# Patient Record
Sex: MALE | Race: WHITE | NOT HISPANIC OR LATINO | Employment: STUDENT | URBAN - METROPOLITAN AREA
[De-identification: names, ages, dates, MRNs, and addresses within clinical notes are randomized per-mention and may not be internally consistent; named-entity substitution may affect disease eponyms.]

---

## 2021-01-15 ENCOUNTER — OFFICE VISIT (OUTPATIENT)
Dept: URGENT CARE | Facility: CLINIC | Age: 31
End: 2021-01-15
Payer: COMMERCIAL

## 2021-01-15 ENCOUNTER — HOSPITAL ENCOUNTER (OUTPATIENT)
Facility: MEDICAL CENTER | Age: 31
End: 2021-01-15
Attending: PHYSICIAN ASSISTANT
Payer: COMMERCIAL

## 2021-01-15 ENCOUNTER — HOSPITAL ENCOUNTER (OUTPATIENT)
Dept: RADIOLOGY | Facility: MEDICAL CENTER | Age: 31
End: 2021-01-15
Attending: PHYSICIAN ASSISTANT
Payer: COMMERCIAL

## 2021-01-15 VITALS
RESPIRATION RATE: 16 BRPM | WEIGHT: 256.2 LBS | HEIGHT: 74 IN | SYSTOLIC BLOOD PRESSURE: 154 MMHG | HEART RATE: 76 BPM | BODY MASS INDEX: 32.88 KG/M2 | DIASTOLIC BLOOD PRESSURE: 88 MMHG | OXYGEN SATURATION: 97 % | TEMPERATURE: 96.7 F

## 2021-01-15 DIAGNOSIS — K29.00 ACUTE SUPERFICIAL GASTRITIS WITHOUT HEMORRHAGE: ICD-10-CM

## 2021-01-15 DIAGNOSIS — R10.11 RIGHT UPPER QUADRANT PAIN: ICD-10-CM

## 2021-01-15 DIAGNOSIS — R10.13 EPIGASTRIC ABDOMINAL PAIN: ICD-10-CM

## 2021-01-15 DIAGNOSIS — F10.10 EXCESSIVE DRINKING ALCOHOL: ICD-10-CM

## 2021-01-15 DIAGNOSIS — K21.9 GASTROESOPHAGEAL REFLUX DISEASE, UNSPECIFIED WHETHER ESOPHAGITIS PRESENT: ICD-10-CM

## 2021-01-15 LAB
ALBUMIN SERPL BCP-MCNC: 5.1 G/DL (ref 3.2–4.9)
ALBUMIN/GLOB SERPL: 1.7 G/DL
ALP SERPL-CCNC: 67 U/L (ref 30–99)
ALT SERPL-CCNC: 19 U/L (ref 2–50)
ANION GAP SERPL CALC-SCNC: 13 MMOL/L (ref 7–16)
AST SERPL-CCNC: 10 U/L (ref 12–45)
BASOPHILS # BLD AUTO: 0.6 % (ref 0–1.8)
BASOPHILS # BLD: 0.04 K/UL (ref 0–0.12)
BILIRUB SERPL-MCNC: 0.4 MG/DL (ref 0.1–1.5)
BUN SERPL-MCNC: 21 MG/DL (ref 8–22)
CALCIUM SERPL-MCNC: 9.9 MG/DL (ref 8.5–10.5)
CHLORIDE SERPL-SCNC: 102 MMOL/L (ref 96–112)
CO2 SERPL-SCNC: 24 MMOL/L (ref 20–33)
CREAT SERPL-MCNC: 1.14 MG/DL (ref 0.5–1.4)
EOSINOPHIL # BLD AUTO: 0.06 K/UL (ref 0–0.51)
EOSINOPHIL NFR BLD: 0.9 % (ref 0–6.9)
ERYTHROCYTE [DISTWIDTH] IN BLOOD BY AUTOMATED COUNT: 43.8 FL (ref 35.9–50)
GLOBULIN SER CALC-MCNC: 3 G/DL (ref 1.9–3.5)
GLUCOSE SERPL-MCNC: 90 MG/DL (ref 65–99)
HCT VFR BLD AUTO: 48.5 % (ref 42–52)
HGB BLD-MCNC: 15.9 G/DL (ref 14–18)
IMM GRANULOCYTES # BLD AUTO: 0.03 K/UL (ref 0–0.11)
IMM GRANULOCYTES NFR BLD AUTO: 0.4 % (ref 0–0.9)
LIPASE SERPL-CCNC: 35 U/L (ref 11–82)
LYMPHOCYTES # BLD AUTO: 1.36 K/UL (ref 1–4.8)
LYMPHOCYTES NFR BLD: 20.4 % (ref 22–41)
MCH RBC QN AUTO: 30.1 PG (ref 27–33)
MCHC RBC AUTO-ENTMCNC: 32.8 G/DL (ref 33.7–35.3)
MCV RBC AUTO: 91.9 FL (ref 81.4–97.8)
MONOCYTES # BLD AUTO: 0.38 K/UL (ref 0–0.85)
MONOCYTES NFR BLD AUTO: 5.7 % (ref 0–13.4)
NEUTROPHILS # BLD AUTO: 4.81 K/UL (ref 1.82–7.42)
NEUTROPHILS NFR BLD: 72 % (ref 44–72)
NRBC # BLD AUTO: 0 K/UL
NRBC BLD-RTO: 0 /100 WBC
PLATELET # BLD AUTO: 295 K/UL (ref 164–446)
PMV BLD AUTO: 10 FL (ref 9–12.9)
POTASSIUM SERPL-SCNC: 4.3 MMOL/L (ref 3.6–5.5)
PROT SERPL-MCNC: 8.1 G/DL (ref 6–8.2)
RBC # BLD AUTO: 5.28 M/UL (ref 4.7–6.1)
SODIUM SERPL-SCNC: 139 MMOL/L (ref 135–145)
WBC # BLD AUTO: 6.7 K/UL (ref 4.8–10.8)

## 2021-01-15 PROCEDURE — 76705 ECHO EXAM OF ABDOMEN: CPT

## 2021-01-15 PROCEDURE — 80053 COMPREHEN METABOLIC PANEL: CPT

## 2021-01-15 PROCEDURE — 99204 OFFICE O/P NEW MOD 45 MIN: CPT | Performed by: PHYSICIAN ASSISTANT

## 2021-01-15 PROCEDURE — 83690 ASSAY OF LIPASE: CPT

## 2021-01-15 PROCEDURE — 85025 COMPLETE CBC W/AUTO DIFF WBC: CPT

## 2021-01-15 RX ORDER — OMEPRAZOLE 20 MG/1
20 CAPSULE, DELAYED RELEASE ORAL DAILY
COMMUNITY
End: 2021-01-28

## 2021-01-15 RX ORDER — OMEPRAZOLE 40 MG/1
40 CAPSULE, DELAYED RELEASE ORAL DAILY
Qty: 30 CAP | Refills: 0 | Status: SHIPPED | OUTPATIENT
Start: 2021-01-15

## 2021-01-15 RX ORDER — SUCRALFATE 1 G/1
1 TABLET ORAL
Qty: 120 TAB | Refills: 3 | Status: SHIPPED | OUTPATIENT
Start: 2021-01-15

## 2021-01-15 SDOH — HEALTH STABILITY: MENTAL HEALTH: HOW MANY STANDARD DRINKS CONTAINING ALCOHOL DO YOU HAVE ON A TYPICAL DAY?: 3 OR 4

## 2021-01-15 SDOH — HEALTH STABILITY: MENTAL HEALTH: HOW OFTEN DO YOU HAVE 6 OR MORE DRINKS ON ONE OCCASION?: LESS THAN MONTHLY

## 2021-01-15 SDOH — HEALTH STABILITY: MENTAL HEALTH: HOW OFTEN DO YOU HAVE A DRINK CONTAINING ALCOHOL?: 4 OR MORE TIMES A WEEK

## 2021-01-15 ASSESSMENT — ENCOUNTER SYMPTOMS
HEARTBURN: 1
DIARRHEA: 0
ABDOMINAL PAIN: 1
CONSTIPATION: 1
NAUSEA: 1
FEVER: 0
VOMITING: 1
BLOOD IN STOOL: 0
CHILLS: 0

## 2021-01-15 NOTE — PROGRESS NOTES
Subjective:     Yossi Davis  is a 30 y.o. male who presents for Abdominal Pain (x 2-3 months, mid upper abdominal pain)      Mr. Davis is a 30-year-old male who presents today complaining of 2 to 3-month history of epigastric abdominal pain with associated nausea.  Patient reports that over the past 2 to 3 months he has just not been feeling well in general.  He has been wanting to get established with a primary care but has been unsuccessful.  He reports that the epigastric abdominal pain has progressively been worsening and approximately 1 week ago he had severe pain.  The severe pain was described as sharp and more located in the epigastric region and to the left.  Patient reports this lasted for 3 to 4 hours and eventually subsided with sleep.  Patient does admit to drinking a rather large amount of alcohol preceding the onset of symptoms.  Patient does admit to drinking alcohol on a fairly regular basis.  Patient denies hematemesis, hematochezia or melena.  Has no prior history of pancreatitis.  He does admit to previous history with GERD and did begin taking over-the-counter omeprazole thinking that this was possibly stomach irritation related to alcohol consumption.  Denies fever or chills.     Abdominal Pain  Associated symptoms include constipation, nausea and vomiting. Pertinent negatives include no diarrhea, fever or melena.         Review of Systems   Constitutional: Positive for malaise/fatigue. Negative for chills and fever.   Gastrointestinal: Positive for abdominal pain, constipation, heartburn, nausea and vomiting. Negative for blood in stool, diarrhea and melena.   All other systems reviewed and are negative.    No Known Allergies  Past medical history, family history, surgical history and social history are reviewed and updated in the record today.     Objective:   /88 (BP Location: Left arm, Patient Position: Sitting, BP Cuff Size: Large adult)   Pulse 76   Temp 35.9 °C (96.7 °F)  "(Temporal)   Resp 16   Ht 1.88 m (6' 2\")   Wt 116.2 kg (256 lb 3.2 oz)   SpO2 97%   BMI 32.89 kg/m²   Physical Exam  Vitals signs reviewed.   Constitutional:       Appearance: He is well-developed. He is not ill-appearing or toxic-appearing.   HENT:      Head: Normocephalic and atraumatic.      Right Ear: External ear normal.      Left Ear: Ear canal and external ear normal.      Nose: Nose normal.      Mouth/Throat:      Lips: Pink.      Mouth: Mucous membranes are moist.      Pharynx: Uvula midline. No oropharyngeal exudate.   Eyes:      Conjunctiva/sclera: Conjunctivae normal.      Pupils: Pupils are equal, round, and reactive to light.   Neck:      Musculoskeletal: Normal range of motion and neck supple.   Cardiovascular:      Rate and Rhythm: Normal rate and regular rhythm.      Heart sounds: Normal heart sounds. No murmur. No friction rub.   Pulmonary:      Effort: Pulmonary effort is normal. No respiratory distress.      Breath sounds: Normal breath sounds.   Abdominal:      General: Bowel sounds are normal.      Palpations: Abdomen is soft.      Tenderness: There is abdominal tenderness in the right upper quadrant and epigastric area. There is no right CVA tenderness, left CVA tenderness, guarding or rebound. Positive signs include Ramirez's sign.       Musculoskeletal: Normal range of motion.   Lymphadenopathy:      Head:      Right side of head: No submental, submandibular or tonsillar adenopathy.      Left side of head: No submental, submandibular or tonsillar adenopathy.      Cervical: No cervical adenopathy.      Upper Body:      Right upper body: No supraclavicular adenopathy.      Left upper body: No supraclavicular adenopathy.   Skin:     General: Skin is warm and dry.      Findings: No rash.   Neurological:      Mental Status: He is alert and oriented to person, place, and time.      Cranial Nerves: Cranial nerves are intact. No cranial nerve deficit.      Sensory: Sensation is intact. No " sensory deficit.      Motor: Motor function is intact.      Coordination: Coordination is intact. Coordination normal.      Gait: Gait is intact.   Psychiatric:         Attention and Perception: Attention normal.         Mood and Affect: Mood normal.         Speech: Speech normal.         Behavior: Behavior normal.         Thought Content: Thought content normal.         Judgment: Judgment normal.          Assessment/Plan:   1. Acute superficial gastritis without hemorrhage  - US-RUQ; Future  - CBC WITH DIFFERENTIAL; Future  - COMP METABOLIC PANEL  - LIPASE; Future  - sucralfate (CARAFATE) 1 GM Tab; Take 1 Tab by mouth 4 Times a Day,Before Meals and at Bedtime.  Dispense: 120 Tab; Refill: 3  - omeprazole (PRILOSEC) 40 MG delayed-release capsule; Take 1 Cap by mouth every day.  Dispense: 30 Cap; Refill: 0  - REFERRAL TO FOLLOW-UP WITH PRIMARY CARE  - REFERRAL TO GASTROENTEROLOGY    2. Gastroesophageal reflux disease, unspecified whether esophagitis present  - US-RUQ; Future  - CBC WITH DIFFERENTIAL; Future  - COMP METABOLIC PANEL  - LIPASE; Future  - sucralfate (CARAFATE) 1 GM Tab; Take 1 Tab by mouth 4 Times a Day,Before Meals and at Bedtime.  Dispense: 120 Tab; Refill: 3  - omeprazole (PRILOSEC) 40 MG delayed-release capsule; Take 1 Cap by mouth every day.  Dispense: 30 Cap; Refill: 0  - REFERRAL TO FOLLOW-UP WITH PRIMARY CARE  - REFERRAL TO GASTROENTEROLOGY    3. Right upper quadrant pain  - US-RUQ; Future  - CBC WITH DIFFERENTIAL; Future  - COMP METABOLIC PANEL  - LIPASE; Future  - sucralfate (CARAFATE) 1 GM Tab; Take 1 Tab by mouth 4 Times a Day,Before Meals and at Bedtime.  Dispense: 120 Tab; Refill: 3  - omeprazole (PRILOSEC) 40 MG delayed-release capsule; Take 1 Cap by mouth every day.  Dispense: 30 Cap; Refill: 0  - REFERRAL TO FOLLOW-UP WITH PRIMARY CARE    4. Epigastric abdominal pain  - US-RUQ; Future  - CBC WITH DIFFERENTIAL; Future  - COMP METABOLIC PANEL  - LIPASE; Future  - sucralfate (CARAFATE) 1 GM  Tab; Take 1 Tab by mouth 4 Times a Day,Before Meals and at Bedtime.  Dispense: 120 Tab; Refill: 3  - omeprazole (PRILOSEC) 40 MG delayed-release capsule; Take 1 Cap by mouth every day.  Dispense: 30 Cap; Refill: 0  - REFERRAL TO FOLLOW-UP WITH PRIMARY CARE  - REFERRAL TO GASTROENTEROLOGY    5. Excessive drinking alcohol  - US-RUQ; Future  - CBC WITH DIFFERENTIAL; Future  - COMP METABOLIC PANEL  - LIPASE; Future  - sucralfate (CARAFATE) 1 GM Tab; Take 1 Tab by mouth 4 Times a Day,Before Meals and at Bedtime.  Dispense: 120 Tab; Refill: 3  - omeprazole (PRILOSEC) 40 MG delayed-release capsule; Take 1 Cap by mouth every day.  Dispense: 30 Cap; Refill: 0  - REFERRAL TO FOLLOW-UP WITH PRIMARY CARE    Differential diagnosis to include but not limited to pancreatitis, gastritis, cholecystitis, cholelithiasis, GERD.  Given the patient's history of alcohol intake in particular preceding his most recent episode I am more concerned about the possibility of pancreatitis.  We will go ahead and check labs as above.  However, patient does have positive Ramirez sign with right upper quadrant tenderness as well.  We will check ultrasound regarding this.    Patient is counseled on avoidance of alcohol consumption.  We will increase his Prilosec from over-the-counter to prescription strength 40 mg daily and counseled on taking this 30 minutes before the first meal of the day on an empty stomach.  Patient is also given sucralfate.  Referral placed to gastroenterology as well as to establish care with primary care.    1641: Attempted to contact patient with lab results as follows:  Results for JANE JIM (MRN [id number]) as of 1/15/2021 16:40   Ref. Range 1/15/2021 12:46 1/15/2021 16:25   WBC Latest Ref Range: 4.8 - 10.8 K/uL 6.7    RBC Latest Ref Range: 4.70 - 6.10 M/uL 5.28    Hemoglobin Latest Ref Range: 14.0 - 18.0 g/dL 15.9    Hematocrit Latest Ref Range: 42.0 - 52.0 % 48.5    MCV Latest Ref Range: 81.4 - 97.8 fL 91.9    MCH  Latest Ref Range: 27.0 - 33.0 pg 30.1    MCHC Latest Ref Range: 33.7 - 35.3 g/dL 32.8 (L)    RDW Latest Ref Range: 35.9 - 50.0 fL 43.8    Platelet Count Latest Ref Range: 164 - 446 K/uL 295    MPV Latest Ref Range: 9.0 - 12.9 fL 10.0    Neutrophils-Polys Latest Ref Range: 44.00 - 72.00 % 72.00    Neutrophils (Absolute) Latest Ref Range: 1.82 - 7.42 K/uL 4.81    Lymphocytes Latest Ref Range: 22.00 - 41.00 % 20.40 (L)    Lymphs (Absolute) Latest Ref Range: 1.00 - 4.80 K/uL 1.36    Monocytes Latest Ref Range: 0.00 - 13.40 % 5.70    Monos (Absolute) Latest Ref Range: 0.00 - 0.85 K/uL 0.38    Eosinophils Latest Ref Range: 0.00 - 6.90 % 0.90    Eos (Absolute) Latest Ref Range: 0.00 - 0.51 K/uL 0.06    Basophils Latest Ref Range: 0.00 - 1.80 % 0.60    Baso (Absolute) Latest Ref Range: 0.00 - 0.12 K/uL 0.04    Immature Granulocytes Latest Ref Range: 0.00 - 0.90 % 0.40    Immature Granulocytes (abs) Latest Ref Range: 0.00 - 0.11 K/uL 0.03    Nucleated RBC Latest Units: /100 WBC 0.00    NRBC (Absolute) Latest Units: K/uL 0.00    Sodium Latest Ref Range: 135 - 145 mmol/L 139    Potassium Latest Ref Range: 3.6 - 5.5 mmol/L 4.3    Chloride Latest Ref Range: 96 - 112 mmol/L 102    Co2 Latest Ref Range: 20 - 33 mmol/L 24    Anion Gap Latest Ref Range: 7.0 - 16.0  13.0    Glucose Latest Ref Range: 65 - 99 mg/dL 90    Bun Latest Ref Range: 8 - 22 mg/dL 21    Creatinine Latest Ref Range: 0.50 - 1.40 mg/dL 1.14    GFR If  Latest Ref Range: >60 mL/min/1.73 m 2 >60    GFR If Non  Latest Ref Range: >60 mL/min/1.73 m 2 >60    Calcium Latest Ref Range: 8.5 - 10.5 mg/dL 9.9    AST(SGOT) Latest Ref Range: 12 - 45 U/L 10 (L)    ALT(SGPT) Latest Ref Range: 2 - 50 U/L 19    Alkaline Phosphatase Latest Ref Range: 30 - 99 U/L 67    Total Bilirubin Latest Ref Range: 0.1 - 1.5 mg/dL 0.4    Albumin Latest Ref Range: 3.2 - 4.9 g/dL 5.1 (H)    Total Protein Latest Ref Range: 6.0 - 8.2 g/dL 8.1    Globulin Latest Ref  Range: 1.9 - 3.5 g/dL 3.0    A-G Ratio Latest Units: g/dL 1.7    Lipase Latest Ref Range: 11 - 82 U/L 35    US-RUQ Unknown  Rpt     LIPASE: 35    Voicemail left with recommendation to proceed with ultrasound.  Patient will be contacted with results once available.  Patient is again reminded of red flag warning symptoms with ER/follow-up precautions.    1736:  Contacted patient by telephone with right upper quadrant ultrasound report as follows:  FINDINGS:  The liver is normal in contour. There is no evidence of solid mass lesion. The liver measures 14.12 cm.     The gallbladder is normal. There is no evidence of cholelithiasis.  The gallbladder wall thickness measures 0.17 cm. There is no pericholecystic fluid.  The common duct measures 0.40 cm.     The visualized pancreas is unremarkable.  The visualized aorta is normal in caliber.     Intrahepatic IVC is patent.     The portal vein is patent with hepatopetal flow. The MPV measures 1.26 cm.     The right kidney measures 10.99 cm. No right hydronephrosis.     There is no ascites.     IMPRESSION:     No gallstones or dilatation of the common duct.  No free fluid.    No change to plan at this time.  I suspect patient is experiencing gastritis likely related to alcohol consumption.  Recommend avoidance of alcohol at this time.  Proceed with Prilosec and Carafate, follow-up with primary care and GI.      Upon entering exam room I ensured patient was wearing a mask.  This provider wore appropriate PPE throughout entire visit.  Patient wore mask entire visit except for a brief period while examining oropharynx.    Differential diagnosis, natural history, supportive care, and indications for immediate follow-up discussed.  Red flag warning symptoms and strict ER/follow-up precautions given.  The patient demonstrated a good understanding and agreed with the treatment plan.  Please note that this note was created using voice recognition speech to text software. Every effort  has been made to correct obvious errors.  However, I expect there are errors of grammar and possibly context that were not discovered prior to finalizing the note  STroy Weber PA-C

## 2021-01-15 NOTE — PATIENT INSTRUCTIONS
Acute Pancreatitis    Acute pancreatitis happens when the pancreas gets swollen. The pancreas is a large gland in the body that helps to control blood sugar. It also makes enzymes that help to digest food.  This condition can last a few days and cause serious problems. The lungs, heart, and kidneys may stop working.  What are the causes?  Causes include:  · Alcohol abuse.  · Drug abuse.  · Gallstones.  · A tumor in the pancreas.  Other causes include:  · Some medicines.  · Some chemicals.  · Diabetes.  · An infection.  · Damage caused by an accident.  · The poison (venom) from a scorpion bite.  · Belly (abdominal) surgery.  · The body's defense system (immune system) attacking the pancreas (autoimmune pancreatitis).  · Genes that are passed from parent to child (inherited).  In some cases, the cause is not known.  What are the signs or symptoms?  · Pain in the upper belly that may be felt in the back. The pain may be very bad.  · Swelling of the belly.  · Feeling sick to your stomach (nauseous) and throwing up (vomiting).  · Fever.  How is this treated?  You will likely have to stay in the hospital. Treatment may include:  · Pain medicine.  · Fluid through an IV tube.  · Placing a tube in the stomach to take out the stomach contents. This may help you stop throwing up.  · Not eating for 3-4 days.  · Antibiotic medicines, if you have an infection.  · Treating any other problems that may be the cause.  · Steroid medicines, if your problem is caused by your defense system attacking your body's own tissues.  · Surgery.  Follow these instructions at home:  Eating and drinking    · Follow instructions from your doctor about what to eat and drink.  · Eat foods that do not have a lot of fat in them.  · Eat small meals often. Do not eat big meals.  · Drink enough fluid to keep your pee (urine) pale yellow.  · Do not drink alcohol if it caused your condition.  Medicines  · Take over-the-counter and prescription medicines only  as told by your doctor.  · Ask your doctor if the medicine prescribed to you:  ? Requires you to avoid driving or using heavy machinery.  ? Can cause trouble pooping (constipation). You may need to take steps to prevent or treat trouble pooping:  § Take over-the-counter or prescription medicines.  § Eat foods that are high in fiber. These include beans, whole grains, and fresh fruits and vegetables.  § Limit foods that are high in fat and sugar. These include fried or sweet foods.  General instructions  · Do not use any products that contain nicotine or tobacco, such as cigarettes, e-cigarettes, and chewing tobacco. If you need help quitting, ask your doctor.  · Get plenty of rest.  · Check your blood sugar at home as told by your doctor.  · Keep all follow-up visits as told by your doctor. This is important.  Contact a doctor if:  · You do not get better as quickly as expected.  · You have new symptoms.  · Your symptoms get worse.  · You have pain or weakness that lasts a long time.  · You keep feeling sick to your stomach.  · You get better and then you have pain again.  · You have a fever.  Get help right away if:  · You cannot eat or keep fluids down.  · Your pain gets very bad.  · Your skin or the white part of your eyes turns yellow.  · You have sudden swelling in your belly.  · You throw up.  · You feel dizzy or you pass out (faint).  · Your blood sugar is high (over 300 mg/dL).  Summary  · Acute pancreatitis happens when the pancreas gets swollen.  · This condition is often caused by alcohol abuse, drug abuse, or gallstones.  · You will likely have to stay in the hospital for treatment.  This information is not intended to replace advice given to you by your health care provider. Make sure you discuss any questions you have with your health care provider.  Document Released: 06/05/2009 Document Revised: 10/07/2019 Document Reviewed: 10/07/2019  Elsevier Patient Education © 2020 [employer]  Gastroesophageal Reflux Disease, Adult  Gastroesophageal reflux (MICHEAL) happens when acid from the stomach flows up into the tube that connects the mouth and the stomach (esophagus). Normally, food travels down the esophagus and stays in the stomach to be digested. With MICHEAL, food and stomach acid sometimes move back up into the esophagus. You may have a disease called gastroesophageal reflux disease (GERD) if the reflux:  · Happens often.  · Causes frequent or very bad symptoms.  · Causes problems such as damage to the esophagus.  When this happens, the esophagus becomes sore and swollen (inflamed). Over time, GERD can make small holes (ulcers) in the lining of the esophagus.  What are the causes?  This condition is caused by a problem with the muscle between the esophagus and the stomach. When this muscle is weak or not normal, it does not close properly to keep food and acid from coming back up from the stomach. The muscle can be weak because of:  · Tobacco use.  · Pregnancy.  · Having a certain type of hernia (hiatal hernia).  · Alcohol use.  · Certain foods and drinks, such as coffee, chocolate, onions, and peppermint.  What increases the risk?  You are more likely to develop this condition if you:  · Are overweight.  · Have a disease that affects your connective tissue.  · Use NSAID medicines.  What are the signs or symptoms?  Symptoms of this condition include:  · Heartburn.  · Difficult or painful swallowing.  · The feeling of having a lump in the throat.  · A bitter taste in the mouth.  · Bad breath.  · Having a lot of saliva.  · Having an upset or bloated stomach.  · Belching.  · Chest pain. Different conditions can cause chest pain. Make sure you see your doctor if you have chest pain.  · Shortness of breath or noisy breathing (wheezing).  · Ongoing (chronic) cough or a cough at night.  · Wearing away of the surface of teeth (tooth enamel).  · Weight loss.  How is this treated?  Treatment will depend  on how bad your symptoms are. Your doctor may suggest:  · Changes to your diet.  · Medicine.  · Surgery.  Follow these instructions at home:  Eating and drinking    · Follow a diet as told by your doctor. You may need to avoid foods and drinks such as:  ? Coffee and tea (with or without caffeine).  ? Drinks that contain alcohol.  ? Energy drinks and sports drinks.  ? Bubbly (carbonated) drinks or sodas.  ? Chocolate and cocoa.  ? Peppermint and mint flavorings.  ? Garlic and onions.  ? Horseradish.  ? Spicy and acidic foods. These include peppers, chili powder, perez powder, vinegar, hot sauces, and BBQ sauce.  ? Citrus fruit juices and citrus fruits, such as oranges, radha, and limes.  ? Tomato-based foods. These include red sauce, chili, salsa, and pizza with red sauce.  ? Fried and fatty foods. These include donuts, french fries, potato chips, and high-fat dressings.  ? High-fat meats. These include hot dogs, rib eye steak, sausage, ham, and webber.  ? High-fat dairy items, such as whole milk, butter, and cream cheese.  · Eat small meals often. Avoid eating large meals.  · Avoid drinking large amounts of liquid with your meals.  · Avoid eating meals during the 2-3 hours before bedtime.  · Avoid lying down right after you eat.  · Do not exercise right after you eat.  Lifestyle    · Do not use any products that contain nicotine or tobacco. These include cigarettes, e-cigarettes, and chewing tobacco. If you need help quitting, ask your doctor.  · Try to lower your stress. If you need help doing this, ask your doctor.  · If you are overweight, lose an amount of weight that is healthy for you. Ask your doctor about a safe weight loss goal.  General instructions  · Pay attention to any changes in your symptoms.  · Take over-the-counter and prescription medicines only as told by your doctor. Do not take aspirin, ibuprofen, or other NSAIDs unless your doctor says it is okay.  · Wear loose clothes. Do not wear anything  tight around your waist.  · Raise (elevate) the head of your bed about 6 inches (15 cm).  · Avoid bending over if this makes your symptoms worse.  · Keep all follow-up visits as told by your doctor. This is important.  Contact a doctor if:  · You have new symptoms.  · You lose weight and you do not know why.  · You have trouble swallowing or it hurts to swallow.  · You have wheezing or a cough that keeps happening.  · Your symptoms do not get better with treatment.  · You have a hoarse voice.  Get help right away if:  · You have pain in your arms, neck, jaw, teeth, or back.  · You feel sweaty, dizzy, or light-headed.  · You have chest pain or shortness of breath.  · You throw up (vomit) and your throw-up looks like blood or coffee grounds.  · You pass out (faint).  · Your poop (stool) is bloody or black.  · You cannot swallow, drink, or eat.  Summary  · If a person has gastroesophageal reflux disease (GERD), food and stomach acid move back up into the esophagus and cause symptoms or problems such as damage to the esophagus.  · Treatment will depend on how bad your symptoms are.  · Follow a diet as told by your doctor.  · Take all medicines only as told by your doctor.  This information is not intended to replace advice given to you by your health care provider. Make sure you discuss any questions you have with your health care provider.  Document Released: 06/05/2009 Document Revised: 06/26/2019 Document Reviewed: 06/26/2019  [employer] Patient Education © 2020 [employer] Inc.

## 2021-01-19 ENCOUNTER — TELEPHONE (OUTPATIENT)
Dept: SCHEDULING | Facility: IMAGING CENTER | Age: 31
End: 2021-01-19

## 2021-01-25 ENCOUNTER — PATIENT OUTREACH (OUTPATIENT)
Dept: MEDICAL GROUP | Facility: MEDICAL CENTER | Age: 31
End: 2021-01-25

## 2021-01-25 NOTE — PROGRESS NOTES
NEW PATIENT VISIT PRE-VISIT PLANNING    1.  [employer] Patient is checked in Patient Demographics?Yes    2.  Immunizations were updated in Epic using Reconcile Outside Information activity? Yes         3.  Is this appointment scheduled as a Hospital Follow-Up? No    4.  Patient is due for the following Health Maintenance Topics:   Health Maintenance Due   Topic Date Due   • IMM DTaP/Tdap/Td Vaccine (7 - Td) 05/04/2017   • IMM INFLUENZA (1) 09/01/2020       5.  Reviewed/Updated the following with patient:       •   Preferred Pharmacy? Yes       •   Preferred Lab? Yes       •   Preferred Communication? Yes       •   Allergies? Yes       •   Medications? YES. Was Abstract Encounter opened and chart updated? YES       •   Social History? Yes       •   Family History (document living status of immediate family members and if + hx of  cancer, diabetes, hypertension, hyperlipidemia, heart attack, stroke) No    6.  Updated Care Team?       •   DME Company (gait device, O2, CPAP, etc.) N\A       •   Other Specialists (eye doctor, derm, GYN, cardiology, endo, etc): N\A    7.  AHA (Puls8) form printed for Provider? N/A

## 2021-01-28 ENCOUNTER — TELEPHONE (OUTPATIENT)
Dept: MEDICAL GROUP | Facility: MEDICAL CENTER | Age: 31
End: 2021-01-28

## 2021-01-28 ENCOUNTER — TELEMEDICINE (OUTPATIENT)
Dept: MEDICAL GROUP | Facility: MEDICAL CENTER | Age: 31
End: 2021-01-28
Payer: COMMERCIAL

## 2021-01-28 VITALS — RESPIRATION RATE: 16 BRPM | WEIGHT: 255 LBS | HEIGHT: 74 IN | BODY MASS INDEX: 32.73 KG/M2

## 2021-01-28 DIAGNOSIS — Z13.220 SCREENING FOR HYPERLIPIDEMIA: ICD-10-CM

## 2021-01-28 DIAGNOSIS — Z13.21 ENCOUNTER FOR VITAMIN DEFICIENCY SCREENING: ICD-10-CM

## 2021-01-28 DIAGNOSIS — Z82.49 FAMILY HISTORY OF PREMATURE CAD: ICD-10-CM

## 2021-01-28 DIAGNOSIS — D17.9 MULTIPLE LIPOMAS: ICD-10-CM

## 2021-01-28 DIAGNOSIS — E66.9 OBESITY (BMI 30-39.9): ICD-10-CM

## 2021-01-28 DIAGNOSIS — K21.9 GERD WITHOUT ESOPHAGITIS: ICD-10-CM

## 2021-01-28 DIAGNOSIS — Z13.29 SCREENING FOR THYROID DISORDER: ICD-10-CM

## 2021-01-28 PROCEDURE — 99214 OFFICE O/P EST MOD 30 MIN: CPT | Performed by: NURSE PRACTITIONER

## 2021-01-28 ASSESSMENT — FIBROSIS 4 INDEX: FIB4 SCORE: 0.22

## 2021-01-28 ASSESSMENT — PATIENT HEALTH QUESTIONNAIRE - PHQ9: CLINICAL INTERPRETATION OF PHQ2 SCORE: 0

## 2021-01-28 NOTE — ASSESSMENT & PLAN NOTE
Has had gastritis in past with drinking too much etoh when he was in college.  That resolved.  Then over the weekend recently he did too much etoh.  He had reoccurrence with gastrities.  He went to .  He is now on prilosec and carafate.

## 2021-01-28 NOTE — PROGRESS NOTES
Virtual Visit: Established Patient   This visit was conducted via Zoom using secure and encrypted videoconferencing technology. The patient was in a private location in the state of [place].    The patient's identity was confirmed and verbal consent was obtained for this virtual visit.    Subjective:   CC:   Chief Complaint   Patient presents with   • New Patient     prior pcp/devin   • Establish Care       Yossi Davis is a 30 y.o. male presenting for evaluation and management of: establish care.  He has a nodule on rt neck.  His previous PCP told him it was a lipoma.  Now he has several over body.  Multiple on torso, legs, chest and arms.  He feels that the ones in his abd are causing his  GERD.   He worries about them d/t an uncle having lymphoma.  No sx of lymphoma.  Some of the lipomas are painful.    Reviewed recent lab with pt.  CMP, CBC, GFR is wnl.    He needs LP, D lab done for preventative lab.    GERD without esophagitis  Has had gastritis in past with drinking too much etoh when he was in college.  That resolved.  Then over the weekend recently he did too much etoh.  He had reoccurrence with gastrities.  He went to .  He is now on prilosec and carafate.    Family history of premature CAD  Both maternal grandparents had MI's in 40-50's.         Patient Active Problem List    Diagnosis Date Noted   • GERD without esophagitis 01/28/2021   • Obesity (BMI 30-39.9) 01/28/2021   • Multiple lipomas 01/28/2021   • Family history of premature CAD 01/28/2021     Current Outpatient Medications   Medication Sig Dispense Refill   • sucralfate (CARAFATE) 1 GM Tab Take 1 Tab by mouth 4 Times a Day,Before Meals and at Bedtime. 120 Tab 3   • omeprazole (PRILOSEC) 40 MG delayed-release capsule Take 1 Cap by mouth every day. 30 Cap 0     No current facility-administered medications for this visit.      Flonase [fluticasone]  Past Medical History:   Diagnosis Date   • Family history of premature CAD 1/28/2021   • GERD  (gastroesophageal reflux disease)    • Multiple lipomas 1/28/2021     Social History     Socioeconomic History   • Marital status: Single     Spouse name: Not on file   • Number of children: Not on file   • Years of education: Not on file   • Highest education level: Not on file   Occupational History   • Not on file   Social Needs   • Financial resource strain: Not on file   • Food insecurity     Worry: Not on file     Inability: Not on file   • Transportation needs     Medical: Not on file     Non-medical: Not on file   Tobacco Use   • Smoking status: Former Smoker     Types: Cigarettes   • Smokeless tobacco: Current User     Types: Chew   • Tobacco comment: nicotine salt packages   Substance and Sexual Activity   • Alcohol use: Yes     Alcohol/week: 5.4 oz     Types: 7 Cans of beer, 2 Shots of liquor per week     Frequency: 4 or more times a week     Drinks per session: 3 or 4     Binge frequency: Less than monthly     Comment: socially (weekend drinker) recently dec amt   • Drug use: Yes     Types: Inhaled, Marijuana     Comment: Marijuana (daily but recently quit)   • Sexual activity: Not Currently   Lifestyle   • Physical activity     Days per week: Not on file     Minutes per session: Not on file   • Stress: Not on file   Relationships   • Social connections     Talks on phone: Not on file     Gets together: Not on file     Attends Nondenominational service: Not on file     Active member of club or organization: Not on file     Attends meetings of clubs or organizations: Not on file     Relationship status: Not on file   • Intimate partner violence     Fear of current or ex partner: Not on file     Emotionally abused: Not on file     Physically abused: Not on file     Forced sexual activity: Not on file   Other Topics Concern   • Not on file   Social History Narrative   • Not on file     Family History   Problem Relation Age of Onset   • Hyperlipidemia Father    • Hypertension Father    • Lung Disease Father          asthma   • Lung Disease Sister         rubaa   • Heart Disease Maternal Grandmother 45        MI   • Heart Disease Maternal Grandfather 50        MI   • Dementia Paternal Grandmother    • Other Paternal Grandfather         liver failure   • Lung Disease Sister         asthma         ROS   Review of Systems   Constitutional: Negative.  Negative for fever, chills, weight loss, malaise/fatigue and diaphoresis.   HENT: Negative.  Negative for hearing loss, ear pain, nosebleeds, congestion, sore throat, neck pain, tinnitus and ear discharge.    Respiratory: Negative.  Negative for cough, hemoptysis, sputum production, shortness of breath, wheezing and stridor.    Cardiovascular: Negative.  Negative for chest pain, palpitations, orthopnea, claudication, leg swelling and PND.   Gastrointestinal: denies nausea, vomiting, diarrhea, constipation, melena or hematochezia.  Genitourinary: Denies dysuria, hematuria, urinary incontinence, frequency or urgency.    Musculoskeletal: Negative.  Negative for myalgias and back pain.   Neurological: Negative.  Negative for dizziness, tremors, weakness and headaches. tingling on rt back of head occas.    Psych:  Denies depression, anxiety or insomnia.  All other systems reviewed and are negative.      No Known Allergies    Current medicines (including changes today)  Current Outpatient Medications   Medication Sig Dispense Refill   • sucralfate (CARAFATE) 1 GM Tab Take 1 Tab by mouth 4 Times a Day,Before Meals and at Bedtime. 120 Tab 3   • omeprazole (PRILOSEC) 40 MG delayed-release capsule Take 1 Cap by mouth every day. 30 Cap 0     No current facility-administered medications for this visit.        Patient Active Problem List    Diagnosis Date Noted   • GERD without esophagitis 01/28/2021   • Obesity (BMI 30-39.9) 01/28/2021       Family History   Problem Relation Age of Onset   • Hyperlipidemia Father    • Hypertension Father        He  has a past medical history of GERD  "(gastroesophageal reflux disease). He also has no past medical history of Asthma or Diabetes (HCC).  He  has no past surgical history on file.       Objective:   Resp 16   Ht 1.88 m (6' 2\")   Wt 116 kg (255 lb)   BMI 32.74 kg/m²     Physical Exam:  Constitutional: Alert, no distress, well-groomed.  Skin: No rashes in visible areas.  Eye: Round. Conjunctiva clear, lids normal. No icterus.   ENMT: Lips pink without lesions, good dentition, moist mucous membranes. Phonation normal.  Neck: No masses, no thyromegaly. Moves freely without pain.  Respiratory: Unlabored respiratory effort, no cough or audible wheeze  Psych: Alert and oriented x3, normal affect and mood.       Assessment and Plan:   The following treatment plan was discussed:     1. GERD without esophagitis  H.PYLORI STOOL ANTIGEN    recently had much worse sx assoc with ETOH.  now improved on prilosec & carafate.  chk h pylori stool.  f/u 1 mo for review   2. Multiple lipomas  REFERRAL TO PLASTIC SURGERY    CANCELED: REFERRAL TO PLASTIC SURGERY    has multiple lipomas all over body.  some painful.  would like removed.     3. Family history of premature CAD      grandparents with premature CAD. marlon alonzo LP.  f/u for review   4. Encounter for vitamin deficiency screening  VITAMIN D,25 HYDROXY    VITAMIN B12    FOLATE    do lab and f/u for review 1 mo   5. Screening for thyroid disorder  TSH WITH REFLEX TO FT4   6. Screening for hyperlipidemia  Lipid Profile   7. Obesity (BMI 30-39.9)  Patient identified as having weight management issue.  Appropriate orders and counseling given.           Follow-up: Return in about 4 weeks (around 2/25/2021).           "

## 2021-02-13 LAB
25(OH)D3+25(OH)D2 SERPL-MCNC: 31.6 NG/ML (ref 30–100)
CHOLEST SERPL-MCNC: 214 MG/DL (ref 100–199)
FOLATE SERPL-MCNC: 13.9 NG/ML
HDLC SERPL-MCNC: 50 MG/DL
LABORATORY COMMENT REPORT: ABNORMAL
LDLC SERPL CALC-MCNC: 151 MG/DL (ref 0–99)
TRIGL SERPL-MCNC: 71 MG/DL (ref 0–149)
TSH SERPL DL<=0.005 MIU/L-ACNC: 1.15 UIU/ML (ref 0.45–4.5)
VIT B12 SERPL-MCNC: 454 PG/ML (ref 232–1245)
VLDLC SERPL CALC-MCNC: 13 MG/DL (ref 5–40)

## 2021-02-18 ENCOUNTER — TELEPHONE (OUTPATIENT)
Dept: MEDICAL GROUP | Facility: MEDICAL CENTER | Age: 31
End: 2021-02-18

## 2021-02-18 NOTE — TELEPHONE ENCOUNTER
ESTABLISHED PATIENT PRE-VISIT PLANNING     Patient was NOT contacted to complete PVP.     Note: Patient will not be contacted if there is no indication to call.     1.  Reviewed notes from the last few office visits within the medical group: Yes    2.  If any orders were placed at last visit or intended to be done for this visit (i.e. 6 mos follow-up), do we have Results/Consult Notes?         •  Labs - Labs ordered, completed on 02/11/2021 and results are in chart.  Note: If patient appointment is for lab review and patient did not complete labs, check with provider if OK to reschedule patient until labs completed.       •  Imaging - Imaging ordered, completed and results are in chart.       •  Referrals - Referral ordered, patient has NOT been seen.    3. Is this appointment scheduled as a Hospital Follow-Up? No    4.  Immunizations were updated in Epic using Reconcile Outside Information activity? Yes    5.  Patient is due for the following Health Maintenance Topics:   Health Maintenance Due   Topic Date Due   • IMM DTaP/Tdap/Td Vaccine (7 - Td) 05/04/2017   • IMM INFLUENZA (1) 09/01/2020       6.  AHA (Pulse8) form printed for Provider? N/A       Patient NOT contacted to complete AWV PVP. Information reviewed in chart.   Outside information NOT reconciled using the [employer] feature. Per Lilliam Richard, the [employer] feature is down as of 02/09/2021 at 2:00pm. Will reconcile outside information at a later date.

## 2021-02-19 LAB — H PYLORI AG STL QL IA: NEGATIVE

## 2021-02-25 ENCOUNTER — APPOINTMENT (OUTPATIENT)
Dept: MEDICAL GROUP | Facility: MEDICAL CENTER | Age: 31
End: 2021-02-25
Payer: COMMERCIAL

## 2021-03-25 NOTE — TELEPHONE ENCOUNTER
No imaging ordered from last office visit with Yeny Foster. Dot phrase clicked in error for imaging results. No outstanding orders for upcoming office visit.   ----------------------------------------------------------------------------------------------------  Pre-Visit Planning note has been created for the Provider and Medical Assistant to review prior to the patient's office appointment. Patient is NOT REQUIRED to follow-up on this note.

## 2021-04-01 ENCOUNTER — TELEPHONE (OUTPATIENT)
Dept: MEDICAL GROUP | Facility: MEDICAL CENTER | Age: 31
End: 2021-04-01

## 2021-04-01 ENCOUNTER — TELEMEDICINE (OUTPATIENT)
Dept: MEDICAL GROUP | Facility: MEDICAL CENTER | Age: 31
End: 2021-04-01
Payer: COMMERCIAL

## 2021-04-01 VITALS — WEIGHT: 250 LBS | BODY MASS INDEX: 32.08 KG/M2 | HEIGHT: 74 IN

## 2021-04-01 DIAGNOSIS — E55.9 VITAMIN D DEFICIENCY: ICD-10-CM

## 2021-04-01 DIAGNOSIS — K21.9 GERD WITHOUT ESOPHAGITIS: ICD-10-CM

## 2021-04-01 DIAGNOSIS — E78.5 HYPERLIPIDEMIA LDL GOAL <100: ICD-10-CM

## 2021-04-01 PROCEDURE — 99214 OFFICE O/P EST MOD 30 MIN: CPT | Mod: 95,CR | Performed by: NURSE PRACTITIONER

## 2021-04-01 ASSESSMENT — FIBROSIS 4 INDEX: FIB4 SCORE: 0.22

## 2021-04-01 NOTE — PROGRESS NOTES
Virtual Visit: Established Patient   This visit was conducted via Zoom using secure and encrypted videoconferencing technology. The patient was in a private location in the state of [place].    The patient's identity was confirmed and verbal consent was obtained for this virtual visit.    Subjective:   CC: GERD    Yossi Davis is a 30 y.o. male presenting for evaluation and management of:  GERD  He is now feeling better with his GERD.  Off prilosec and carafate.  Eating healthy.  No breakthrough sx. No black tarry stool.    Reviewed lab with pt.  H pylori is neg.  TSH, D, B12, FOLATE is wnl.  B12 is 454.  He is not on otc supplement.  Vitamin d is low normal at 31.6.  He is not on otc d3 supplement.  LP shows trg and HDL are at goal.  LDL is high at 151.  Goal is <100 d/t bmi 32 and FH.  He has already started increasing his exercise.  Reviewed approp diet with pt for low fat/chol.        ROS   Review of Systems   Constitutional: Negative.  Negative for fever, chills, weight loss, malaise/fatigue and diaphoresis.   HENT: Negative.  Negative for hearing loss, ear pain, nosebleeds, congestion, sore throat, neck pain, tinnitus and ear discharge.    Respiratory: Negative.  Negative for cough, hemoptysis, sputum production, shortness of breath, wheezing and stridor.    Cardiovascular: Negative.  Negative for chest pain, palpitations, orthopnea, claudication, leg swelling and PND.   Gastrointestinal: denies nausea, vomiting, diarrhea, constipation, melena or hematochezia.  Genitourinary: Denies dysuria, hematuria, urinary incontinence, frequency or urgency.    Musculoskeletal: Negative.  Negative for myalgias and back pain.   Neurological: Negative.  Negative for dizziness, tingling, tremors, weakness and headaches.   Psych:  Denies depression, anxiety or insomnia.  All other systems reviewed and are negative.      Allergies   Allergen Reactions   • Flonase [Fluticasone]      Nose bleeds       Current medicines  "(including changes today)  Current Outpatient Medications   Medication Sig Dispense Refill   • sucralfate (CARAFATE) 1 GM Tab Take 1 Tab by mouth 4 Times a Day,Before Meals and at Bedtime. 120 Tab 3   • omeprazole (PRILOSEC) 40 MG delayed-release capsule Take 1 Cap by mouth every day. 30 Cap 0     No current facility-administered medications for this visit.       Patient Active Problem List    Diagnosis Date Noted   • GERD without esophagitis 01/28/2021   • Obesity (BMI 30-39.9) 01/28/2021   • Multiple lipomas 01/28/2021   • Family history of premature CAD 01/28/2021       Family History   Problem Relation Age of Onset   • Hyperlipidemia Father    • Hypertension Father    • Lung Disease Father         asthma   • Lung Disease Sister         ashtma   • Heart Disease Maternal Grandmother 45        MI   • Heart Disease Maternal Grandfather 50        MI   • Dementia Paternal Grandmother    • Other Paternal Grandfather         liver failure   • Lung Disease Sister         asthma       He  has a past medical history of Family history of premature CAD (1/28/2021), GERD (gastroesophageal reflux disease), and Multiple lipomas (1/28/2021). He also has no past medical history of Asthma or Diabetes ([facility]).  He  has no past surgical history on file.       Objective:   Ht 1.88 m (6' 2\")   Wt 113 kg (250 lb)   BMI 32.10 kg/m²     Physical Exam:  Constitutional: Alert, no distress, well-groomed.  Skin: No rashes in visible areas.  Eye: Round. Conjunctiva clear, lids normal. No icterus.   ENMT: Lips pink without lesions, good dentition, moist mucous membranes. Phonation normal.  Neck: No masses, no thyromegaly. Moves freely without pain.  Respiratory: Unlabored respiratory effort, no cough or audible wheeze  Psych: Alert and oriented x3, normal affect and mood.       Assessment and Plan:   The following treatment plan was discussed:     1. Hyperlipidemia LDL goal <100  Lipid Profile    prefers to try diet and exercise initially " before med.  he will improve healthy lifestyle.  recheck lab 4 mo.  f/u for review   2. Vitamin D deficiency  VITAMIN D,25 HYDROXY    VITAMIN B12    b12 and d3 low normal.  take d3 2000 units and b12 1000 mcg daily otc.  recheck lab 4 mo.  f/u for review   3. GERD without esophagitis      sx resolved with improved healthy diet         Follow-up: Return in about 4 months (around 8/1/2021).

## 2021-04-01 NOTE — LETTER
April 1, 2021        Yossi Davis  [address] [place] [address] [address] [id number]        Dear Yossi:    Attached are fasting lab orders.    If you have any questions or concerns, please don't hesitate to call.        Sincerely,        ADRIÁN Malcolm.    Electronically Signed

## 2024-09-19 SDOH — HEALTH STABILITY: PHYSICAL HEALTH: ON AVERAGE, HOW MANY DAYS PER WEEK DO YOU ENGAGE IN MODERATE TO STRENUOUS EXERCISE (LIKE A BRISK WALK)?: 3 DAYS

## 2024-09-19 SDOH — HEALTH STABILITY: PHYSICAL HEALTH: ON AVERAGE, HOW MANY MINUTES DO YOU ENGAGE IN EXERCISE AT THIS LEVEL?: 60 MIN

## 2024-09-19 SDOH — ECONOMIC STABILITY: TRANSPORTATION INSECURITY
IN THE PAST 12 MONTHS, HAS LACK OF RELIABLE TRANSPORTATION KEPT YOU FROM MEDICAL APPOINTMENTS, MEETINGS, WORK OR FROM GETTING THINGS NEEDED FOR DAILY LIVING?: NO

## 2024-09-19 SDOH — ECONOMIC STABILITY: TRANSPORTATION INSECURITY
IN THE PAST 12 MONTHS, HAS THE LACK OF TRANSPORTATION KEPT YOU FROM MEDICAL APPOINTMENTS OR FROM GETTING MEDICATIONS?: NO

## 2024-09-19 SDOH — ECONOMIC STABILITY: HOUSING INSECURITY
IN THE LAST 12 MONTHS, WAS THERE A TIME WHEN YOU DID NOT HAVE A STEADY PLACE TO SLEEP OR SLEPT IN A SHELTER (INCLUDING NOW)?: NO

## 2024-09-19 SDOH — ECONOMIC STABILITY: INCOME INSECURITY: HOW HARD IS IT FOR YOU TO PAY FOR THE VERY BASICS LIKE FOOD, HOUSING, MEDICAL CARE, AND HEATING?: NOT HARD AT ALL

## 2024-09-19 SDOH — ECONOMIC STABILITY: INCOME INSECURITY: IN THE LAST 12 MONTHS, WAS THERE A TIME WHEN YOU WERE NOT ABLE TO PAY THE MORTGAGE OR RENT ON TIME?: NO

## 2024-09-19 SDOH — ECONOMIC STABILITY: FOOD INSECURITY: WITHIN THE PAST 12 MONTHS, THE FOOD YOU BOUGHT JUST DIDN'T LAST AND YOU DIDN'T HAVE MONEY TO GET MORE.: NEVER TRUE

## 2024-09-19 SDOH — ECONOMIC STABILITY: FOOD INSECURITY: WITHIN THE PAST 12 MONTHS, YOU WORRIED THAT YOUR FOOD WOULD RUN OUT BEFORE YOU GOT MONEY TO BUY MORE.: NEVER TRUE

## 2024-09-19 SDOH — HEALTH STABILITY: MENTAL HEALTH
STRESS IS WHEN SOMEONE FEELS TENSE, NERVOUS, ANXIOUS, OR CAN'T SLEEP AT NIGHT BECAUSE THEIR MIND IS TROUBLED. HOW STRESSED ARE YOU?: ONLY A LITTLE

## 2024-09-19 SDOH — ECONOMIC STABILITY: TRANSPORTATION INSECURITY
IN THE PAST 12 MONTHS, HAS LACK OF TRANSPORTATION KEPT YOU FROM MEETINGS, WORK, OR FROM GETTING THINGS NEEDED FOR DAILY LIVING?: NO

## 2024-09-19 ASSESSMENT — SOCIAL DETERMINANTS OF HEALTH (SDOH)
DO YOU BELONG TO ANY CLUBS OR ORGANIZATIONS SUCH AS CHURCH GROUPS UNIONS, FRATERNAL OR ATHLETIC GROUPS, OR SCHOOL GROUPS?: NO
HOW OFTEN DO YOU GET TOGETHER WITH FRIENDS OR RELATIVES?: MORE THAN THREE TIMES A WEEK
HOW HARD IS IT FOR YOU TO PAY FOR THE VERY BASICS LIKE FOOD, HOUSING, MEDICAL CARE, AND HEATING?: NOT HARD AT ALL
IN A TYPICAL WEEK, HOW MANY TIMES DO YOU TALK ON THE PHONE WITH FAMILY, FRIENDS, OR NEIGHBORS?: MORE THAN THREE TIMES A WEEK
HOW OFTEN DO YOU ATTENT MEETINGS OF THE CLUB OR ORGANIZATION YOU BELONG TO?: PATIENT DECLINED
HOW OFTEN DO YOU ATTEND CHURCH OR RELIGIOUS SERVICES?: 1 TO 4 TIMES PER YEAR
ARE YOU MARRIED, WIDOWED, DIVORCED, SEPARATED, NEVER MARRIED, OR LIVING WITH A PARTNER?: PATIENT DECLINED
HOW MANY DRINKS CONTAINING ALCOHOL DO YOU HAVE ON A TYPICAL DAY WHEN YOU ARE DRINKING: 1 OR 2
IN THE PAST 12 MONTHS, HAS THE ELECTRIC, GAS, OIL, OR WATER COMPANY THREATENED TO SHUT OFF SERVICE IN YOUR HOME?: NO
HOW OFTEN DO YOU ATTEND CHURCH OR RELIGIOUS SERVICES?: 1 TO 4 TIMES PER YEAR
HOW OFTEN DO YOU GET TOGETHER WITH FRIENDS OR RELATIVES?: MORE THAN THREE TIMES A WEEK
ARE YOU MARRIED, WIDOWED, DIVORCED, SEPARATED, NEVER MARRIED, OR LIVING WITH A PARTNER?: PATIENT DECLINED
HOW OFTEN DO YOU HAVE SIX OR MORE DRINKS ON ONE OCCASION: LESS THAN MONTHLY
HOW OFTEN DO YOU ATTENT MEETINGS OF THE CLUB OR ORGANIZATION YOU BELONG TO?: PATIENT DECLINED
HOW OFTEN DO YOU HAVE A DRINK CONTAINING ALCOHOL: 2-3 TIMES A WEEK
DO YOU BELONG TO ANY CLUBS OR ORGANIZATIONS SUCH AS CHURCH GROUPS UNIONS, FRATERNAL OR ATHLETIC GROUPS, OR SCHOOL GROUPS?: NO
WITHIN THE PAST 12 MONTHS, YOU WORRIED THAT YOUR FOOD WOULD RUN OUT BEFORE YOU GOT THE MONEY TO BUY MORE: NEVER TRUE
IN A TYPICAL WEEK, HOW MANY TIMES DO YOU TALK ON THE PHONE WITH FAMILY, FRIENDS, OR NEIGHBORS?: MORE THAN THREE TIMES A WEEK

## 2024-09-19 ASSESSMENT — LIFESTYLE VARIABLES
SKIP TO QUESTIONS 9-10: 0
AUDIT-C TOTAL SCORE: 4
HOW OFTEN DO YOU HAVE A DRINK CONTAINING ALCOHOL: 2-3 TIMES A WEEK
HOW OFTEN DO YOU HAVE SIX OR MORE DRINKS ON ONE OCCASION: LESS THAN MONTHLY
HOW MANY STANDARD DRINKS CONTAINING ALCOHOL DO YOU HAVE ON A TYPICAL DAY: 1 OR 2

## 2024-09-20 ENCOUNTER — OFFICE VISIT (OUTPATIENT)
Dept: MEDICAL GROUP | Facility: IMAGING CENTER | Age: 34
End: 2024-09-20
Payer: COMMERCIAL

## 2024-09-20 VITALS
SYSTOLIC BLOOD PRESSURE: 124 MMHG | OXYGEN SATURATION: 96 % | HEIGHT: 74 IN | TEMPERATURE: 98.1 F | RESPIRATION RATE: 16 BRPM | WEIGHT: 266 LBS | BODY MASS INDEX: 34.14 KG/M2 | DIASTOLIC BLOOD PRESSURE: 78 MMHG | HEART RATE: 79 BPM

## 2024-09-20 DIAGNOSIS — Z72.0 NICOTINE USE: ICD-10-CM

## 2024-09-20 DIAGNOSIS — R07.9 CHEST PAIN, UNSPECIFIED TYPE: ICD-10-CM

## 2024-09-20 DIAGNOSIS — Z13.21 ENCOUNTER FOR VITAMIN DEFICIENCY SCREENING: ICD-10-CM

## 2024-09-20 DIAGNOSIS — E78.5 DYSLIPIDEMIA: ICD-10-CM

## 2024-09-20 DIAGNOSIS — K21.9 GERD WITHOUT ESOPHAGITIS: ICD-10-CM

## 2024-09-20 DIAGNOSIS — E66.9 OBESITY (BMI 30-39.9): ICD-10-CM

## 2024-09-20 DIAGNOSIS — Z00.00 HEALTHCARE MAINTENANCE: ICD-10-CM

## 2024-09-20 DIAGNOSIS — Z11.59 NEED FOR HEPATITIS C SCREENING TEST: ICD-10-CM

## 2024-09-20 DIAGNOSIS — F12.90 MARIJUANA USE: ICD-10-CM

## 2024-09-20 DIAGNOSIS — L81.9 HYPOPIGMENTATION: ICD-10-CM

## 2024-09-20 DIAGNOSIS — Z23 ENCOUNTER FOR ADMINISTRATION OF VACCINE: ICD-10-CM

## 2024-09-20 DIAGNOSIS — Z11.4 SCREENING FOR HIV (HUMAN IMMUNODEFICIENCY VIRUS): ICD-10-CM

## 2024-09-20 PROCEDURE — 99214 OFFICE O/P EST MOD 30 MIN: CPT | Mod: 25 | Performed by: STUDENT IN AN ORGANIZED HEALTH CARE EDUCATION/TRAINING PROGRAM

## 2024-09-20 PROCEDURE — 90471 IMMUNIZATION ADMIN: CPT | Performed by: STUDENT IN AN ORGANIZED HEALTH CARE EDUCATION/TRAINING PROGRAM

## 2024-09-20 PROCEDURE — 90632 HEPA VACCINE ADULT IM: CPT | Mod: JZ | Performed by: STUDENT IN AN ORGANIZED HEALTH CARE EDUCATION/TRAINING PROGRAM

## 2024-09-20 ASSESSMENT — PATIENT HEALTH QUESTIONNAIRE - PHQ9: CLINICAL INTERPRETATION OF PHQ2 SCORE: 0

## 2024-09-20 NOTE — PROGRESS NOTES
Subjective:       CC:   Chief Complaint   Patient presents with    Establish Care    Referral Needed     Dermatology - discoloration on nose       HPI:         Yossi is a 34 y.o. male is new to me and is here today to establish care. Previous PCP was Yeny SAMPSON .  Pt would like to discuss the following today    Dyslipidemia: reports last cholesterol check was a year ago. He has been doing lifestyle changes.     GERD: chronic. Reports acid reflux has diminished with lifestyle modifications. He is no longer taking medications for acid reflux.     Scarring/discoloration on nose: He was getting a facial and the person doing the facial expressed a concern about the discoloration he has on his nose. He has history of prolonged sun exposure. He wears sunscreen. Has family history of skin cancer. Skin on his nose is sensitive.             ROS:   Reports sporadic episodes of CP every few weeks. Denies SOB, palpitations, and dizziness. Anxiety and marijuana tend to trigger the CP. CP is brief.     Patient Active Problem List    Diagnosis Date Noted    Dyslipidemia 09/20/2024    Chest pain 09/20/2024    Nicotine use 09/20/2024    Marijuana use 09/20/2024    GERD without esophagitis 01/28/2021    Obesity (BMI 30-39.9) 01/28/2021    Multiple lipomas 01/28/2021    Family history of premature CAD 01/28/2021       Past Medical History:   Diagnosis Date    Anxiety     Family history of premature CAD 01/28/2021    GERD (gastroesophageal reflux disease)     Multiple lipomas 01/28/2021       No current outpatient medications on file.     No current facility-administered medications for this visit.       Allergies as of 09/20/2024 - Reviewed 09/20/2024   Allergen Reaction Noted    Flonase [fluticasone]  01/28/2021       Social History     Socioeconomic History    Marital status: Single     Spouse name: Not on file    Number of children: Not on file    Years of education: Not on file    Highest education level: Bachelor's  degree (e.g., BA, AB, BS)   Occupational History    Not on file   Tobacco Use    Smoking status: Former     Types: Cigarettes    Smokeless tobacco: Current     Types: Chew    Tobacco comments:     nicotine salt packages   Vaping Use    Vaping status: Some Days    Start date: 2/1/2019    Substances: THC    Devices: flower THC   Substance and Sexual Activity    Alcohol use: Yes     Alcohol/week: 3.0 oz     Types: 3 Cans of beer, 2 Shots of liquor per week     Comment: socially (weekend drinker) recently dec amt    Drug use: Yes     Types: Marijuana, Inhaled     Comment: Marijuana (daily but recently quit)    Sexual activity: Yes     Partners: Female     Birth control/protection: Condom   Other Topics Concern    Not on file   Social History Narrative    Not on file     Social Determinants of Health     Financial Resource Strain: Low Risk  (9/19/2024)    Overall Financial Resource Strain (CARDIA)     Difficulty of Paying Living Expenses: Not hard at all   Food Insecurity: No Food Insecurity (9/19/2024)    Hunger Vital Sign     Worried About Running Out of Food in the Last Year: Never true     Ran Out of Food in the Last Year: Never true   Transportation Needs: No Transportation Needs (9/19/2024)    PRAPARE - Transportation     Lack of Transportation (Medical): No     Lack of Transportation (Non-Medical): No   Physical Activity: Sufficiently Active (9/19/2024)    Exercise Vital Sign     Days of Exercise per Week: 3 days     Minutes of Exercise per Session: 60 min   Stress: No Stress Concern Present (9/19/2024)    [employer] [place] [employer] - Occupational Stress Questionnaire     Feeling of Stress : Only a little   Social Connections: Unknown (9/19/2024)    Social Connection and Isolation Panel [NHANES]     Frequency of Communication with Friends and Family: More than three times a week     Frequency of Social Gatherings with Friends and Family: More than three times a week     Attends Hinduism Services: 1  "to 4 times per year     Active Member of Clubs or Organizations: No     Attends Club or Organization Meetings: Patient declined     Marital Status: Patient declined   Intimate Partner Violence: Not on file   Housing Stability: Low Risk  (9/19/2024)    Housing Stability Vital Sign     Unable to Pay for Housing in the Last Year: No     Number of Times Moved in the Last Year: 1     Homeless in the Last Year: No       Family History   Problem Relation Age of Onset    Hyperlipidemia Father     Hypertension Father     Lung Disease Father         Asthma    Lung Disease Sister         Asthma    Heart Disease Maternal Grandmother 45        MI    Heart Disease Maternal Grandfather 50        MI    Dementia Paternal Grandmother     Other Paternal Grandfather         liver failure    Lung Disease Sister         asthma    Cancer Paternal Uncle         Lymphoma       History reviewed. No pertinent surgical history.        Objective:     /78 (BP Location: Left arm, Patient Position: Sitting, BP Cuff Size: Adult)   Pulse 79   Temp 36.7 °C (98.1 °F) (Temporal)   Resp 16   Ht 1.88 m (6' 2\")   Wt 121 kg (266 lb)   SpO2 96%   BMI 34.15 kg/m²     Physical Exam  Vitals reviewed.   Constitutional:       General: He is not in acute distress.     Appearance: Normal appearance.   HENT:      Head: Normocephalic and atraumatic.      Right Ear: Tympanic membrane, ear canal and external ear normal. There is no impacted cerumen.      Left Ear: Tympanic membrane and ear canal normal. There is no impacted cerumen.      Nose: Nose normal. No congestion.      Mouth/Throat:      Mouth: Mucous membranes are moist.      Pharynx: Oropharynx is clear. No oropharyngeal exudate or posterior oropharyngeal erythema.   Eyes:      General: No scleral icterus.  Neck:      Thyroid: No thyroid mass or thyromegaly.   Cardiovascular:      Rate and Rhythm: Normal rate and regular rhythm.      Pulses: Normal pulses.      Heart sounds: Normal heart sounds. " No murmur heard.  Pulmonary:      Effort: Pulmonary effort is normal. No respiratory distress.      Breath sounds: Normal breath sounds. No wheezing.   Abdominal:      General: Bowel sounds are normal. There is no distension.      Palpations: Abdomen is soft. There is no mass.      Tenderness: There is no abdominal tenderness.   Musculoskeletal:         General: No swelling. Normal range of motion.      Cervical back: Normal range of motion and neck supple. No tenderness.   Lymphadenopathy:      Cervical: No cervical adenopathy.   Skin:     General: Skin is warm and dry.      Coloration: Skin is not jaundiced.      Findings: No bruising.      Comments: Scarring and erythema present on nose. Hypopigmented macule present on tip of nose.    Neurological:      General: No focal deficit present.      Mental Status: He is alert and oriented to person, place, and time.      Gait: Gait normal.   Psychiatric:         Mood and Affect: Mood normal.         Behavior: Behavior normal.         Thought Content: Thought content normal.         Judgment: Judgment normal.            Assessment and Plan:     1. Chest pain, unspecified type  Chronic, stable.  Patient admits to intermittent episodes of chest pain.  Vital signs are normal.  Heart and lung examination are normal.  I recommend doing an EKG today to rule out cardiac etiology but patient declined EKG today.  He will check with his insurance first regarding price for EKG.  Patient however would like to do chest x-ray to rule out pulmonary etiology.  Chest x-ray ordered.  Patient will also complete his screening labs.  Strict ED precautions given.  - DX-CHEST-2 VIEWS; Future    2. Dyslipidemia  Chronic.  Unknown status.  Will check lipid panel.  - HEMOGLOBIN A1C; Future  - TSH WITH REFLEX TO FT4; Future  - Comp Metabolic Panel; Future  - CBC WITH DIFFERENTIAL; Future  - Lipid Profile; Future    3. GERD without esophagitis  Chronic and well-controlled without medications.  Will  continue to monitor.    4. Obesity (BMI 30-39.9)  Chronic.  Recommend healthy diet low in fat/low carbohydrates and exercise for at least 30 minutes daily or most days of the week.    5. Need for hepatitis C screening test  - HEP C VIRUS ANTIBODY; Future    6. Screening for HIV (human immunodeficiency virus)  - HIV AG/AB COMBO ASSAY SCREENING; Future    7. Encounter for vitamin deficiency screening  - VITAMIN D 25-HYDROXY    8. Encounter for administration of vaccine  - Hep A Adult 19+    9. Nicotine use  10. Marijuana use  Chronic.  Strongly recommend quitting.  - DX-CHEST-2 VIEWS; Future    11. Hypopigmentation  Acute.  Appears benign.  Referred to dermatology as requested by patient.  - Referral to Dermatology    12. Healthcare maintenance  Patient will check with his mom regarding Tdap and varicella immunizations.  Recommend COVID-19 immunizations at the pharmacy.  Patient declined flu shot today.          Return for f/u after completing tests.     Please note that this dictation was created using voice recognition software. I have made every reasonable attempt to correct obvious errors, but I expect that there are errors of grammar and possibly content that I did not discover before finalizing the note.    Yoly Cope PA-C  [facility]

## 2024-09-20 NOTE — PATIENT INSTRUCTIONS
Thank you for choosing [facility]. It was a pleasure meeting you today.     Take care!  [facility]oly [facility]

## 2024-09-27 LAB
25(OH)D3+25(OH)D2 SERPL-MCNC: 32.8 NG/ML (ref 30–100)
ALBUMIN SERPL-MCNC: 4.8 G/DL (ref 4.1–5.1)
ALP SERPL-CCNC: 64 IU/L (ref 44–121)
ALT SERPL-CCNC: 31 IU/L (ref 0–44)
AST SERPL-CCNC: 21 IU/L (ref 0–40)
BASOPHILS # BLD AUTO: 0 X10E3/UL (ref 0–0.2)
BASOPHILS NFR BLD AUTO: 1 %
BILIRUB SERPL-MCNC: 0.5 MG/DL (ref 0–1.2)
BUN SERPL-MCNC: 14 MG/DL (ref 6–20)
BUN/CREAT SERPL: 13 (ref 9–20)
CALCIUM SERPL-MCNC: 10.1 MG/DL (ref 8.7–10.2)
CHLORIDE SERPL-SCNC: 103 MMOL/L (ref 96–106)
CHOLEST SERPL-MCNC: 178 MG/DL (ref 100–199)
CO2 SERPL-SCNC: 24 MMOL/L (ref 20–29)
CREAT SERPL-MCNC: 1.04 MG/DL (ref 0.76–1.27)
EGFRCR SERPLBLD CKD-EPI 2021: 98 ML/MIN/1.73
EOSINOPHIL # BLD AUTO: 0.1 X10E3/UL (ref 0–0.4)
EOSINOPHIL NFR BLD AUTO: 2 %
ERYTHROCYTE [DISTWIDTH] IN BLOOD BY AUTOMATED COUNT: 12.4 % (ref 11.6–15.4)
GLOBULIN SER CALC-MCNC: 2.5 G/DL (ref 1.5–4.5)
GLUCOSE SERPL-MCNC: 88 MG/DL (ref 70–99)
HBA1C MFR BLD: 5.6 % (ref 4.8–5.6)
HCT VFR BLD AUTO: 47.4 % (ref 37.5–51)
HCV IGG SERPL QL IA: NON REACTIVE
HDLC SERPL-MCNC: 52 MG/DL
HGB BLD-MCNC: 15.7 G/DL (ref 13–17.7)
HIV 1+2 AB+HIV1 P24 AG SERPL QL IA: NON REACTIVE
IMM GRANULOCYTES # BLD AUTO: 0 X10E3/UL (ref 0–0.1)
IMM GRANULOCYTES NFR BLD AUTO: 0 %
IMMATURE CELLS  115398: NORMAL
LDL CALC COMMENT:: ABNORMAL
LDLC SERPL CALC-MCNC: 109 MG/DL (ref 0–99)
LYMPHOCYTES # BLD AUTO: 1.6 X10E3/UL (ref 0.7–3.1)
LYMPHOCYTES NFR BLD AUTO: 30 %
MCH RBC QN AUTO: 31 PG (ref 26.6–33)
MCHC RBC AUTO-ENTMCNC: 33.1 G/DL (ref 31.5–35.7)
MCV RBC AUTO: 94 FL (ref 79–97)
MONOCYTES # BLD AUTO: 0.4 X10E3/UL (ref 0.1–0.9)
MONOCYTES NFR BLD AUTO: 7 %
MORPHOLOGY BLD-IMP: NORMAL
NEUTROPHILS # BLD AUTO: 3.3 X10E3/UL (ref 1.4–7)
NEUTROPHILS NFR BLD AUTO: 60 %
NRBC BLD AUTO-RTO: NORMAL %
PLATELET # BLD AUTO: 288 X10E3/UL (ref 150–450)
POTASSIUM SERPL-SCNC: 4.7 MMOL/L (ref 3.5–5.2)
PROT SERPL-MCNC: 7.3 G/DL (ref 6–8.5)
RBC # BLD AUTO: 5.06 X10E6/UL (ref 4.14–5.8)
SODIUM SERPL-SCNC: 141 MMOL/L (ref 134–144)
TRIGL SERPL-MCNC: 93 MG/DL (ref 0–149)
TSH SERPL DL<=0.005 MIU/L-ACNC: 1.42 UIU/ML (ref 0.45–4.5)
VLDLC SERPL CALC-MCNC: 17 MG/DL (ref 5–40)
WBC # BLD AUTO: 5.4 X10E3/UL (ref 3.4–10.8)

## 2024-10-17 ENCOUNTER — OFFICE VISIT (OUTPATIENT)
Dept: DERMATOLOGY | Facility: IMAGING CENTER | Age: 34
End: 2024-10-17
Payer: COMMERCIAL

## 2024-10-17 DIAGNOSIS — L73.9 FOLLICULITIS: ICD-10-CM

## 2024-10-17 DIAGNOSIS — L70.0 ACNE VULGARIS: ICD-10-CM

## 2024-10-17 PROCEDURE — 99214 OFFICE O/P EST MOD 30 MIN: CPT | Performed by: NURSE PRACTITIONER

## 2024-10-17 RX ORDER — TRETINOIN 0.25 MG/G
CREAM TOPICAL
Qty: 45 G | Refills: 1 | Status: SHIPPED | OUTPATIENT
Start: 2024-10-17

## 2024-10-17 RX ORDER — CLINDAMYCIN AND BENZOYL PEROXIDE 10; 50 MG/G; MG/G
GEL TOPICAL
Qty: 50 G | Refills: 3 | Status: SHIPPED | OUTPATIENT
Start: 2024-10-17

## 2024-10-17 RX ORDER — CLINDAMYCIN PHOSPHATE 11.9 MG/ML
SOLUTION TOPICAL
Qty: 60 ML | Refills: 3 | Status: SHIPPED | OUTPATIENT
Start: 2024-10-17

## 2025-03-30 ENCOUNTER — OFFICE VISIT (OUTPATIENT)
Dept: URGENT CARE | Facility: CLINIC | Age: 35
End: 2025-03-30
Payer: COMMERCIAL

## 2025-03-30 VITALS
SYSTOLIC BLOOD PRESSURE: 116 MMHG | HEART RATE: 87 BPM | TEMPERATURE: 97.3 F | BODY MASS INDEX: 36.45 KG/M2 | HEIGHT: 74 IN | RESPIRATION RATE: 18 BRPM | OXYGEN SATURATION: 97 % | WEIGHT: 284 LBS | DIASTOLIC BLOOD PRESSURE: 76 MMHG

## 2025-03-30 DIAGNOSIS — J03.90 EXUDATIVE TONSILLITIS: ICD-10-CM

## 2025-03-30 RX ORDER — LIDOCAINE HYDROCHLORIDE 20 MG/ML
5 SOLUTION OROPHARYNGEAL
Qty: 150 ML | Refills: 0 | Status: SHIPPED | OUTPATIENT
Start: 2025-03-30 | End: 2025-04-04

## 2025-03-30 RX ORDER — AMOXICILLIN 500 MG/1
500 CAPSULE ORAL 2 TIMES DAILY
Qty: 20 CAPSULE | Refills: 0 | Status: SHIPPED | OUTPATIENT
Start: 2025-03-30 | End: 2025-04-09

## 2025-03-30 ASSESSMENT — ENCOUNTER SYMPTOMS
HEADACHES: 0
HOARSE VOICE: 1
NECK PAIN: 1
DIARRHEA: 0
TROUBLE SWALLOWING: 1
COUGH: 0
SWOLLEN GLANDS: 1

## 2025-03-30 ASSESSMENT — FIBROSIS 4 INDEX: FIB4 SCORE: 0.42

## 2025-03-30 NOTE — PROGRESS NOTES
"Patient/parent/guardian has consented to treatment and for use of patient information for treatment and billing purposes.  Subjective:   Yossi Davis  is a 34 y.o. male who presents for Sore Throat (Tonsil swelling, white spots, sore on tongue x2days )       Pharyngitis   This is a new problem. The current episode started in the past 7 days. The problem has been gradually worsening. The pain is worse on the right side. There has been no fever. The pain is at a severity of 4/10. The pain is moderate. Associated symptoms include a hoarse voice, neck pain, swollen glands and trouble swallowing. Pertinent negatives include no congestion, coughing, diarrhea, headaches or plugged ear sensation. He has tried acetaminophen for the symptoms. The treatment provided mild relief.       Review of Systems   HENT:  Positive for hoarse voice and trouble swallowing. Negative for congestion.    Respiratory:  Negative for cough.    Gastrointestinal:  Negative for diarrhea.   Musculoskeletal:  Positive for neck pain.   Neurological:  Negative for headaches.         CURRENT MEDICATIONS:  clindamycin Soln  clindamycin-benzoyl peroxide  tretinoin  Allergies:     Allergies   Allergen Reactions    Flonase [Fluticasone]      Nose bleeds     Current Problems: Yossi Davis does not have any pertinent problems on file.  Past Surgical Hx:  No past surgical history on file.   Past Social Hx:  reports that he has quit smoking. His smoking use included cigarettes. His smokeless tobacco use includes chew. He reports current alcohol use of about 3.0 oz of alcohol per week. He reports current drug use. Drugs: Marijuana and Inhaled.    Objective:   /76   Pulse 87   Temp 36.3 °C (97.3 °F) (Temporal)   Resp 18   Ht 1.88 m (6' 2\")   Wt (!) 129 kg (284 lb)   SpO2 97%   BMI 36.46 kg/m²   Physical Exam  Vitals and nursing note reviewed.   Constitutional:       General: He is not in acute distress.     Appearance: Normal " appearance. He is normal weight. He is ill-appearing.   HENT:      Head: Normocephalic and atraumatic.      Right Ear: External ear normal. A middle ear effusion is present.      Left Ear: External ear normal. A middle ear effusion is present.      Nose: Nose normal. No congestion or rhinorrhea.      Mouth/Throat:      Mouth: Mucous membranes are moist.      Pharynx: Pharyngeal swelling, oropharyngeal exudate and posterior oropharyngeal erythema present. No postnasal drip.      Tonsils: Tonsillar exudate present. No tonsillar abscesses. 4+ on the right. 3+ on the left.   Eyes:      Extraocular Movements: Extraocular movements intact.      Conjunctiva/sclera: Conjunctivae normal.      Pupils: Pupils are equal, round, and reactive to light.   Cardiovascular:      Rate and Rhythm: Normal rate and regular rhythm.      Pulses: Normal pulses.      Heart sounds: Normal heart sounds.   Pulmonary:      Effort: Pulmonary effort is normal.      Breath sounds: Normal breath sounds. No decreased breath sounds, wheezing, rhonchi or rales.   Musculoskeletal:         General: Normal range of motion.      Cervical back: Normal range of motion and neck supple.   Skin:     General: Skin is warm and dry.      Findings: No rash.   Neurological:      General: No focal deficit present.      Mental Status: He is alert and oriented to person, place, and time.   Psychiatric:         Mood and Affect: Mood normal.         Behavior: Behavior normal.       Assessment/Plan:   1. Exudative tonsillitis  - lidocaine (XYLOCAINE) 2 % Solution; Take 5 mL by mouth 6 Times a Day for 5 days. May swish and swallow, or gargle as needed.  Dispense: 150 mL; Refill: 0  - amoxicillin (AMOXIL) 500 MG Cap; Take 1 Capsule by mouth 2 times a day for 10 days.  Dispense: 20 Capsule; Refill: 0    54-year-old male presents with concern for persistent sore throat.  Vital signs stable, afebrile.  In no acute distress appears mildly ill.  Posterior oropharynx with  erythema and swelling.  Right tonsillar swelling 4+, left 3+ with significant exudate on right tonsil.  Middle ear effusion bilaterally.  Exam otherwise negative.  He has a mild history of strep as well as tonsillitis through his childhood years.  Deferred point-of-care strep based on length of time and extent of exudate he has present. Demonstrating fever, tonsillar exudate, erythema and swelling.  Encouraged the patient to return to clinic if they are experiencing new symptoms or their symptoms fail to resolve with time as we cannot rule out all pathology from a single Urgent Care visit. Recommend adequate hydration, rest, deep breathing and coughing, ambulation as tolerated. Improvement can be expected after 24 to 48 hours of the antibiotics, in the interim time feel free to take over-the-counter anti-inflammatories, use salt water gargles, etc. For follow-up I recommend seeing your primary or presenting to clinic.   Red flags, RTC and ER precautions discussed, with patient confirming their understanding of  need for immediate follow-up.  Discussed medication management options, risks and benefits, and alternatives to treatment plan agreed upon. Instructed to continue  medications without changes as ordered by primary care unless aforementioned above.  Patient expresses understanding and agrees to plan of care. All questions or concerns answered. For my MDM, I have personally reviewed previous notes, and test results as pertinent to today's visit.    Please note that this dictation was created using voice recognition software. I have made every reasonable attempt to correct obvious errors,  but there may be grammar errors, and possibly content that I did not discover before finalizing the note.   This note was electronically signed by SENA Ramon

## 2025-03-30 NOTE — PATIENT INSTRUCTIONS
Recommend adequate hydration, rest, deep breathing and coughing, ambulation as tolerated, OTC medications. Delsym OTC cough medication lasts 12 hours, might help you sleep better.  Consider Ponaris Or Nose Better nasal emollients, saline nasal spray for nasal congestion or to prevent nasal passage dryness or allergies. Be sure to change your toothbrush!        Pharyngitis    Pharyngitis is inflammation of the throat (pharynx). It is a very common cause of sore throat. Pharyngitis can be caused by a bacteria, but it is usually caused by a virus. Most cases of pharyngitis get better on their own without treatment.  What are the causes?  This condition may be caused by:  Infection by viruses (viral). Viral pharyngitis spreads easily from person to person (is contagious) through coughing, sneezing, and sharing of personal items or utensils such as cups, forks, spoons, and toothbrushes.  Infection by bacteria (bacterial). Bacterial pharyngitis may be spread by touching the nose or face after coming in contact with the bacteria, or through close contact, such as kissing.  Allergies. Allergies can cause buildup of mucus in the throat (post-nasal drip), leading to inflammation and irritation. Allergies can also cause blocked nasal passages, forcing breathing through the mouth, which dries and irritates the throat.  What increases the risk?  You are more likely to develop this condition if:  You are 5-24 years old.  You are exposed to crowded environments such as , school, or dormitory living.  You live in a cold climate.  You have a weakened disease-fighting (immune) system.  What are the signs or symptoms?  Symptoms of this condition vary by the cause. Common symptoms of this condition include:  Sore throat.  Fatigue.  Low-grade fever.  Stuffy nose (nasal congestion) and cough.  Headache.  Other symptoms may include:  Glands in the neck (lymph nodes) that are swollen.  Skin rashes.  Plaque-like film on the throat or  tonsils. This is often a symptom of bacterial pharyngitis.  Vomiting.  Red, itchy eyes (conjunctivitis).  Loss of appetite.  Joint pain and muscle aches.  Enlarged tonsils.  How is this diagnosed?  This condition may be diagnosed based on your medical history and a physical exam. Your health care provider will ask you questions about your illness and your symptoms.  A swab of your throat may be done to check for bacteria (rapid strep test). Other lab tests may also be done, depending on the suspected cause, but these are rare.  How is this treated?  Many times, treatment is not needed for this condition. Pharyngitis usually gets better in 3-4 days without treatment.  Bacterial pharyngitis may be treated with antibiotic medicines.  Follow these instructions at home:  Medicines  Take over-the-counter and prescription medicines only as told by your health care provider.  If you were prescribed an antibiotic medicine, take it as told by your health care provider. Do not stop taking the antibiotic even if you start to feel better.  Use throat sprays to soothe your throat as told by your health care provider.  Children can get pharyngitis. Do not give your child aspirin because of the association with Reye's syndrome.  Managing pain  To help with pain, try:  Sipping warm liquids, such as broth, herbal tea, or warm water.  Eating or drinking cold or frozen liquids, such as frozen ice pops.  Gargling with a mixture of salt and water 3-4 times a day or as needed. To make salt water, completely dissolve ½-1 tsp (3-6 g) of salt in 1 cup (237 mL) of warm water.  Sucking on hard candy or throat lozenges.  Putting a cool-mist humidifier in your bedroom at night to moisten the air.  Sitting in the bathroom with the door closed for 5-10 minutes while you run hot water in the shower.    General instructions    Do not use any products that contain nicotine or tobacco. These products include cigarettes, chewing tobacco, and vaping  devices, such as e-cigarettes. If you need help quitting, ask your health care provider.  Rest as told by your health care provider.  Drink enough fluid to keep your urine pale yellow.  How is this prevented?  To help prevent becoming infected or spreading infection:  Wash your hands often with soap and water for at least 20 seconds. If soap and water are not available, use hand .  Do not touch your eyes, nose, or mouth with unwashed hands, and wash hands after touching these areas.  Do not share cups or eating utensils.  Avoid close contact with people who are sick.  Contact a health care provider if:  You have large, tender lumps in your neck.  You have a rash.  You cough up green, yellow-brown, or bloody mucus.  Get help right away if:  Your neck becomes stiff.  You drool or are unable to swallow liquids.  You cannot drink or take medicines without vomiting.  You have severe pain that does not go away, even after you take medicine.  You have trouble breathing, and it is not caused by a stuffy nose.  You have new pain and swelling in your joints such as the knees, ankles, wrists, or elbows.  These symptoms may represent a serious problem that is an emergency. Do not wait to see if the symptoms will go away. Get medical help right away. Call your local emergency services (911 in the [place] Do not drive yourself to the hospital.  Summary  Pharyngitis is redness, pain, and swelling (inflammation) of the throat (pharynx).  While pharyngitis can be caused by a bacteria, the most common causes are viral.  Most cases of pharyngitis get better on their own without treatment.  Bacterial pharyngitis is treated with antibiotic medicines.  This information is not intended to replace advice given to you by your health care provider. Make sure you discuss any questions you have with your health care provider.  Document Revised: 03/16/2022 Document Reviewed: 03/16/2022  Elsevier Patient Education © 2023 [employer]  [employer]

## 2025-07-07 ENCOUNTER — OFFICE VISIT (OUTPATIENT)
Dept: URGENT CARE | Facility: CLINIC | Age: 35
End: 2025-07-07
Payer: COMMERCIAL

## 2025-07-07 ENCOUNTER — APPOINTMENT (OUTPATIENT)
Dept: RADIOLOGY | Facility: IMAGING CENTER | Age: 35
End: 2025-07-07
Attending: PHYSICIAN ASSISTANT
Payer: COMMERCIAL

## 2025-07-07 VITALS
BODY MASS INDEX: 36.57 KG/M2 | SYSTOLIC BLOOD PRESSURE: 126 MMHG | OXYGEN SATURATION: 97 % | TEMPERATURE: 96.9 F | DIASTOLIC BLOOD PRESSURE: 80 MMHG | WEIGHT: 285 LBS | HEART RATE: 103 BPM | RESPIRATION RATE: 20 BRPM | HEIGHT: 74 IN

## 2025-07-07 DIAGNOSIS — M25.571 ACUTE RIGHT ANKLE PAIN: ICD-10-CM

## 2025-07-07 DIAGNOSIS — S82.891A CLOSED AVULSION FRACTURE OF RIGHT ANKLE, INITIAL ENCOUNTER: Primary | ICD-10-CM

## 2025-07-07 PROCEDURE — 73610 X-RAY EXAM OF ANKLE: CPT | Mod: TC,RT | Performed by: RADIOLOGY

## 2025-07-07 PROCEDURE — 29515 APPLICATION SHORT LEG SPLINT: CPT | Mod: RT | Performed by: PHYSICIAN ASSISTANT

## 2025-07-07 PROCEDURE — 3079F DIAST BP 80-89 MM HG: CPT | Performed by: PHYSICIAN ASSISTANT

## 2025-07-07 PROCEDURE — 99213 OFFICE O/P EST LOW 20 MIN: CPT | Mod: 25 | Performed by: PHYSICIAN ASSISTANT

## 2025-07-07 PROCEDURE — 3074F SYST BP LT 130 MM HG: CPT | Performed by: PHYSICIAN ASSISTANT

## 2025-07-07 ASSESSMENT — FIBROSIS 4 INDEX: FIB4 SCORE: 0.42

## 2025-07-07 ASSESSMENT — ENCOUNTER SYMPTOMS
LOSS OF MOTION: 0
INABILITY TO BEAR WEIGHT: 1
TINGLING: 0
MUSCLE WEAKNESS: 0
LOSS OF SENSATION: 0
NUMBNESS: 0

## 2025-07-07 NOTE — PROGRESS NOTES
"  Subjective:   Yossi Davis is a 34 y.o. male who presents today with   Chief Complaint   Patient presents with    Ankle Injury     Patient states he was playing basketball on Saturday when he hurt right ankle. States he heard a pop       Ankle Injury   The incident occurred 2 days ago. [place]. The injury mechanism was an inversion injury. The quality of the pain is described as aching. The pain is moderate. The pain has been Constant since onset. Associated symptoms include an inability to bear weight. Pertinent negatives include no loss of motion, loss of sensation, muscle weakness, numbness or tingling. He has tried ice, elevation and non-weight bearing for the symptoms. The treatment provided mild relief.     Patient rolled his ankle while playing basketball on Saturday.    PMH:  has a past medical history of Anxiety, Family history of premature CAD (01/28/2021), GERD (gastroesophageal reflux disease), and Multiple lipomas (01/28/2021).    He has no past medical history of Asthma or Diabetes ([facility]).  MEDS: Current Medications[1]  ALLERGIES: Allergies[2]  SURGHX: Past Surgical History[3]  SOCHX:  reports that he has quit smoking. His smoking use included cigarettes. His smokeless tobacco use includes chew. He reports current alcohol use of about 3.0 oz of alcohol per week. He reports current drug use. Drugs: Marijuana and Inhaled.  FH: Reviewed with patient, not pertinent to this visit.       Review of Systems   Musculoskeletal:         Right ankle pain   Neurological:  Negative for tingling and numbness.        Objective:   /80   Pulse (!) 103   Temp 36.1 °C (96.9 °F) (Temporal)   Resp 20   Ht 1.88 m (6' 2\")   Wt (!) 129 kg (285 lb)   SpO2 97%   BMI 36.59 kg/m²   Physical Exam  Vitals and nursing note reviewed.   Constitutional:       General: He is not in acute distress.     Appearance: Normal appearance. He is well-developed. He is not ill-appearing or " toxic-appearing.   HENT:      Head: Normocephalic and atraumatic.      Right Ear: Hearing normal.      Left Ear: Hearing normal.   Cardiovascular:      Rate and Rhythm: Normal rate.   Pulmonary:      Effort: Pulmonary effort is normal.   Musculoskeletal:        Legs:       Comments: Tenderness palpation through the medial lateral malleolus.  Neurovascular intact distally.  Less than 2 cap refill.  Significant swelling to the area.  Patient with reduced dorsiflexion and plantarflexion secondary to discomfort and swelling.   Skin:     General: Skin is warm and dry.   Neurological:      Mental Status: He is alert.      Coordination: Coordination normal.   Psychiatric:         Mood and Affect: Mood normal.       DX ANKLE  FINDINGS:  Bones: Normal bone mineralization. There are acute avulsion fractures of the lateral aspect of the talar and the distal aspect of the medial malleolus. No other fracture. No focal bone lesion.     Joints: Ankle mortise is preserved. There is a right ankle joint effusion. No subluxation.     Soft tissues: Circumferential right ankle soft tissue swelling.     IMPRESSION:     1.  Acute avulsion fractures of the lateral aspect of the talus (talofibular ligament) and the distal aspect of the medial malleolus.  2.  Right ankle joint effusion and circumferential right ankle soft tissue swelling.    Assessment/Plan:   Assessment    1. Acute right ankle pain  - DX-ANKLE 3+ VIEWS RIGHT; Future    2. Closed avulsion fracture of right ankle, initial encounter  - Referral to Orthopedics    Patient placed in Ortho-Glass posterior leg splint by myself with assistance of the medical assistant.  Recommend using crutches and nonweightbearing ambulation.  Recommend follow-up with orthopedic specialist.  Discussed signs of compartment syndrome which patient is not showing any signs of today on exam.  Patient feels comfortable with splint placement and neurovascular intact distally    Differential diagnosis,  natural history, supportive care, and indications for immediate follow-up discussed.   Patient given instructions and understanding of medications and treatment.    If not improving in 3-5 days, F/U with PCP or return to  if symptoms worsen.    Patient agreeable to plan.    32 minutes were spent reviewing patient's chart, examining and obtaining history from patient, and discussing plan of care.     Please note that this dictation was created using voice recognition software. I have made every reasonable attempt to correct obvious errors, but I expect that there are errors of grammar and possibly content that I did not discover before finalizing the note.    Emmett Carmona PA-C         [1]   Current Outpatient Medications:     clindamycin-benzoyl peroxide (BENZACLIN) gel, AAA daily in am between face wash and moisturizer, Disp: 50 g, Rfl: 3    tretinoin (RETIN-A) 0.025 % cream, AAA at bedtime, pea sized amount after moisturizer, start 2-3 times per week, increase as tolerated, Disp: 45 g, Rfl: 1    clindamycin (CLEOCIN) 1 % Solution, AAA, scalp and back, twice a day for 1-2 weeks until resolved, then can use as needed, Disp: 60 mL, Rfl: 3  [2]   Allergies  Allergen Reactions    Flonase [Fluticasone]      Nose bleeds   [3] No past surgical history on file.

## 2025-07-07 NOTE — Clinical Note
REFERRAL APPROVAL NOTICE         Sent on July 7, 2025                   [address]ossi Davis  [address]  [facility] [facility] [address]                   Dear Mr. Davis,    After a careful review of the medical information and benefit coverage, [facility] has processed your referral. See below for additional details.    If applicable, you must be actively enrolled with your insurance for coverage of the authorized service. If you have any questions regarding your coverage, please contact your insurance directly.    REFERRAL INFORMATION   Referral #:  [id number]  Referred-To Department    Referred-By Provider:  Orthopedics    Emmett Carmona P.A.-C.   [facility] (joint)      [address]  [address]  [facility] [id number] [phone] [address] [place] [id number] [phone]    Referral Start Date:  07/07/2025  Referral End Date:   07/07/2026             SCHEDULING  If you do not already have an appointment, please call [phone] to make an appointment.     MORE INFORMATION  If you do not already have a [facility] account, sign up at: [email or web address]  You can access your medical information, make appointments, see lab results, billing information, and more.  If you have questions regarding this referral, please contact  the [facility] Referrals department at:             [phone]. Monday - Friday 8:00AM - 5:00PM.     Sincerely,    [facility]